# Patient Record
Sex: MALE | Employment: FULL TIME | URBAN - METROPOLITAN AREA
[De-identification: names, ages, dates, MRNs, and addresses within clinical notes are randomized per-mention and may not be internally consistent; named-entity substitution may affect disease eponyms.]

---

## 2024-10-04 ENCOUNTER — OFFICE VISIT (OUTPATIENT)
Dept: URGENT CARE | Age: 24
End: 2024-10-04
Payer: COMMERCIAL

## 2024-10-04 ENCOUNTER — ANCILLARY PROCEDURE (OUTPATIENT)
Dept: URGENT CARE | Age: 24
End: 2024-10-04
Payer: COMMERCIAL

## 2024-10-04 VITALS
RESPIRATION RATE: 18 BRPM | TEMPERATURE: 98 F | DIASTOLIC BLOOD PRESSURE: 82 MMHG | HEART RATE: 68 BPM | OXYGEN SATURATION: 98 % | SYSTOLIC BLOOD PRESSURE: 125 MMHG

## 2024-10-04 DIAGNOSIS — S69.92XA INJURY OF LEFT WRIST, INITIAL ENCOUNTER: Primary | ICD-10-CM

## 2024-10-04 DIAGNOSIS — S69.92XA INJURY OF LEFT WRIST, INITIAL ENCOUNTER: ICD-10-CM

## 2024-10-04 PROCEDURE — 73110 X-RAY EXAM OF WRIST: CPT | Mod: LEFT SIDE

## 2024-10-04 ASSESSMENT — PAIN SCALES - GENERAL: PAINLEVEL: 5

## 2024-10-04 NOTE — PROGRESS NOTES
Subjective   History of Present Illness: Bran Baron is a 24 y.o. male. They present today with a chief complaint of Injury (Right wrist injury 3 days prior. Pt adivsed he was playing soccer and fell onto his right wrist. /Pain 5/10 to 7/10). Has L. Wrist pain on ROM. Is wearing a wrist splint.     Past Medical History  Allergies as of 10/04/2024    (No Known Allergies)       (Not in a hospital admission)       No past medical history on file.    No past surgical history on file.         Review of Systems  Review of Systems                               Objective    Vitals:    10/04/24 1709   BP: 125/82   Pulse: 68   Resp: 18   Temp: 36.7 °C (98 °F)   SpO2: 98%     No LMP for male patient.    Physical Exam  HENT:      Head: Normocephalic and atraumatic.      Nose: Nose normal.      Mouth/Throat:      Mouth: Mucous membranes are moist.   Eyes:      Extraocular Movements: Extraocular movements intact.      Conjunctiva/sclera: Conjunctivae normal.      Pupils: Pupils are equal, round, and reactive to light.   Cardiovascular:      Rate and Rhythm: Normal rate and regular rhythm.   Pulmonary:      Effort: Pulmonary effort is normal.      Breath sounds: Normal breath sounds.   Musculoskeletal:      Left wrist: Tenderness and bony tenderness present. No swelling or snuff box tenderness. Decreased range of motion. Normal pulse.   Skin:     General: Skin is warm.   Neurological:      Mental Status: He is alert and oriented to person, place, and time.   Psychiatric:         Mood and Affect: Mood normal.         Behavior: Behavior normal.             Point of Care Test & Imaging Results from this visit  No results found for this visit on 10/04/24.   XR wrist left 3+ views    Result Date: 10/4/2024  Interpreted By:  Aaron Posada, STUDY: XR WRIST LEFT 3+ VIEWS; ;  10/4/2024 5:31 pm   INDICATION: Signs/Symptoms:l. wrist injury.   ,S69.92XA Unspecified injury of left wrist, hand and finger(s), initial encounter   COMPARISON:  None.   ACCESSION NUMBER(S): [id number]   ORDERING CLINICIAN: JEAN ROLAND   FINDINGS: Left wrist, three views   There is no fracture. There is no dislocation. There are no degenerative changes. There is no lytic or sclerotic lesion. There is no soft tissue abnormality seen.       No acute abnormality seen in the left wrist     MACRO: None   Signed by: Aaron Posada 10/4/2024 5:53 PM Dictation workstation:   [id number]     Diagnostic study results (if any) were reviewed by Jean Roland PA-C.    Assessment/Plan   Allergies, medications, history, and pertinent labs/EKGs/Imaging reviewed by Jean Roland PA-C.     Medical Decision Making  -         Patient is educated about their diagnoses.     -          Discussed medications benefits and adverse effects.     -          Answered all patient’s questions.     -          Patient will call 911 or go to the nearest ED if symptoms worsen.     -          Patient is agreeable to the plan of care and is deemed stable upon discharge     -          Follow up with your primary care provider in two days.      Orders and Diagnoses  Diagnoses and all orders for this visit:  Injury of left wrist, initial encounter  -     XR wrist left 3+ views; Future      Medical Admin Record      Patient disposition: Home    Electronically signed by Jean Roland PA-C  6:12 PM

## 2025-02-13 ENCOUNTER — OFFICE VISIT (OUTPATIENT)
Dept: URGENT CARE | Age: 25
End: 2025-02-13
Payer: COMMERCIAL

## 2025-02-13 DIAGNOSIS — J06.9 VIRAL URI: Primary | ICD-10-CM

## 2025-02-13 RX ORDER — LORATADINE AND PSEUDOEPHEDRINE SULFATE 5; 120 MG/1; MG/1
1 TABLET, EXTENDED RELEASE ORAL 2 TIMES DAILY
Qty: 20 TABLET | Refills: 0 | Status: SHIPPED | OUTPATIENT
Start: 2025-02-13 | End: 2026-02-13

## 2025-02-13 RX ORDER — BENZONATATE 200 MG/1
200 CAPSULE ORAL 3 TIMES DAILY PRN
Qty: 21 CAPSULE | Refills: 0 | Status: SHIPPED | OUTPATIENT
Start: 2025-02-13 | End: 2025-03-15

## 2025-02-13 NOTE — PROGRESS NOTES
Urgent Care Virtual Video Visit    Patient Location: home  Provider Location: [facility]    Video visit completed with realtime synchronous video/audio connection. Informed consent was obtained from the patient. Patient was made aware that my evaluation and diagnosis are limited due to the fact that we are not in the same room during the interview and that this is a virtual encounter that took place via videoconferencing. Patient verbalized understanding.     S:  10 days ago had 24 hours of some acute vomiting and diarrhea, ?tactile temp.    Now last 5 days with increased chest congestion, cough, runny nose.      No fever, chills, some scratchy throat, no earache.  Cough not interfering with sleep.  Some PND    no FREDDIE, remote RAD.    Using Dayquil.  Mild nausea, no V/D    No sick contacts.  no travel.    Works as biomed     [employer]    O:  Exam limited due to nature of visit    A/P     Cough- suspect URI, given typical return precaustions    Patient disposition: Home